# Patient Record
Sex: FEMALE | Race: BLACK OR AFRICAN AMERICAN | NOT HISPANIC OR LATINO
[De-identification: names, ages, dates, MRNs, and addresses within clinical notes are randomized per-mention and may not be internally consistent; named-entity substitution may affect disease eponyms.]

---

## 2018-09-24 PROBLEM — Z00.00 ENCOUNTER FOR PREVENTIVE HEALTH EXAMINATION: Status: ACTIVE | Noted: 2018-09-24

## 2018-10-01 ENCOUNTER — APPOINTMENT (OUTPATIENT)
Dept: CT IMAGING | Facility: CLINIC | Age: 63
End: 2018-10-01

## 2018-10-25 ENCOUNTER — APPOINTMENT (OUTPATIENT)
Dept: CT IMAGING | Facility: CLINIC | Age: 63
End: 2018-10-25

## 2020-10-23 ENCOUNTER — APPOINTMENT (OUTPATIENT)
Dept: CARDIOLOGY | Facility: CLINIC | Age: 65
End: 2020-10-23
Payer: MEDICARE

## 2020-10-23 ENCOUNTER — NON-APPOINTMENT (OUTPATIENT)
Age: 65
End: 2020-10-23

## 2020-10-23 VITALS — HEIGHT: 55 IN | OXYGEN SATURATION: 94 % | WEIGHT: 147 LBS | BODY MASS INDEX: 34.02 KG/M2 | HEART RATE: 63 BPM

## 2020-10-23 VITALS — SYSTOLIC BLOOD PRESSURE: 120 MMHG | DIASTOLIC BLOOD PRESSURE: 70 MMHG

## 2020-10-23 DIAGNOSIS — R93.5 ABNORMAL FINDINGS ON DIAGNOSTIC IMAGING OF OTHER ABDOMINAL REGIONS, INCLUDING RETROPERITONEUM: ICD-10-CM

## 2020-10-23 DIAGNOSIS — R20.0 ANESTHESIA OF SKIN: ICD-10-CM

## 2020-10-23 DIAGNOSIS — R35.0 FREQUENCY OF MICTURITION: ICD-10-CM

## 2020-10-23 DIAGNOSIS — R20.2 ANESTHESIA OF SKIN: ICD-10-CM

## 2020-10-23 DIAGNOSIS — Z87.891 PERSONAL HISTORY OF NICOTINE DEPENDENCE: ICD-10-CM

## 2020-10-23 DIAGNOSIS — M54.9 DORSALGIA, UNSPECIFIED: ICD-10-CM

## 2020-10-23 DIAGNOSIS — Z83.79 FAMILY HISTORY OF OTHER DISEASES OF THE DIGESTIVE SYSTEM: ICD-10-CM

## 2020-10-23 DIAGNOSIS — Z82.0 FAMILY HISTORY OF EPILEPSY AND OTHER DISEASES OF THE NERVOUS SYSTEM: ICD-10-CM

## 2020-10-23 PROCEDURE — 99072 ADDL SUPL MATRL&STAF TM PHE: CPT

## 2020-10-23 PROCEDURE — 93000 ELECTROCARDIOGRAM COMPLETE: CPT

## 2020-10-23 PROCEDURE — 99204 OFFICE O/P NEW MOD 45 MIN: CPT

## 2020-10-23 PROCEDURE — 99407 BEHAV CHNG SMOKING > 10 MIN: CPT

## 2020-10-26 NOTE — PHYSICAL EXAM
[General Appearance - Well Developed] : well developed [Normal Appearance] : normal appearance [Well Groomed] : well groomed [General Appearance - Well Nourished] : well nourished [No Deformities] : no deformities [General Appearance - In No Acute Distress] : no acute distress [Normal Conjunctiva] : the conjunctiva exhibited no abnormalities [Eyelids - No Xanthelasma] : the eyelids demonstrated no xanthelasmas [Normal Oral Mucosa] : normal oral mucosa [No Oral Pallor] : no oral pallor [No Oral Cyanosis] : no oral cyanosis [Normal Jugular Venous A Waves Present] : normal jugular venous A waves present [Normal Jugular Venous V Waves Present] : normal jugular venous V waves present [No Jugular Venous Horvath A Waves] : no jugular venous horvath A waves [Heart Rate And Rhythm] : heart rate and rhythm were normal [Heart Sounds] : normal S1 and S2 [Murmurs] : no murmurs present [Respiration, Rhythm And Depth] : normal respiratory rhythm and effort [Exaggerated Use Of Accessory Muscles For Inspiration] : no accessory muscle use [Auscultation Breath Sounds / Voice Sounds] : lungs were clear to auscultation bilaterally [Abdomen Soft] : soft [Abdomen Tenderness] : non-tender [Abdomen Mass (___ Cm)] : no abdominal mass palpated [Abnormal Walk] : normal gait [Nail Clubbing] : no clubbing of the fingernails [Cyanosis, Localized] : no localized cyanosis [Petechial Hemorrhages (___cm)] : no petechial hemorrhages [Skin Color & Pigmentation] : normal skin color and pigmentation [] : no rash [No Venous Stasis] : no venous stasis [Skin Lesions] : no skin lesions [No Skin Ulcers] : no skin ulcer [No Xanthoma] : no  xanthoma was observed [Oriented To Time, Place, And Person] : oriented to person, place, and time [Affect] : the affect was normal [Mood] : the mood was normal [No Anxiety] : not feeling anxious [FreeTextEntry1] : She uses a walker.  She cannot walk on treadmill.

## 2020-10-26 NOTE — DISCUSSION/SUMMARY
[FreeTextEntry1] : At this time I did not make any changes in her treatment.  I strongly advised her to stop smoking.  Smoking cessation counseling performed more than 10 minutes and  her risk of continued smoking and methods to quit were discussed.  In view of her multiple risk factors I recommended that she have a pharmacological stress test.  Based on the results further recommendations will follow.

## 2020-10-26 NOTE — ASSESSMENT
[FreeTextEntry1] : Her blood pressure is well controlled and her recent hemoglobin A1c was 6.29.  Continuing to smoke is an issue.  She had fasting blood tests in September.  I will try and get copies of the same.  She has multiple risk factors for coronary disease.  He does not have angina.  However a her exercise is limited.  It is  not known if she was getting symptoms with increased physical exertion.

## 2020-10-26 NOTE — REASON FOR VISIT
[Initial Evaluation] : an initial evaluation of [Hyperlipidemia] : hyperlipidemia [Hypertension] : hypertension [FreeTextEntry1] : DM,

## 2020-10-26 NOTE — HISTORY OF PRESENT ILLNESS
[FreeTextEntry1] : 85-year-old  lady was referred by Dr. May Andrade for routine cardiac evaluation.  She had seen Dr. Andrade for a routine visit.  Because of her risk factors she felt that it would be a good idea for her to get routine cardiovascular evaluation.\par \par She denies any exertional chest pain, palpitation, shortness of breath or dizziness.  She had right hip replaced in June of last year.  The left has been bothering since then and she actually needs left hip to be replaced as well.  Dr. Corona wanted her to try and lose some more weight before he would take her for surgery.  She had physical therapy for 3 weeks after the hip replacement but none since.  She does get low back pain which sometimes radiates down the right leg.  She has also diabetic neuropathy.  She is very active in her building and was also taking some dance classes.  When she goes out to walk she is able to walk about 3 blocks and then she feels tired in her legs and thighs.  She also gets some pain.  She does not get any exertional chest pain or shortness of breath.

## 2021-01-18 ENCOUNTER — APPOINTMENT (OUTPATIENT)
Dept: CARDIOLOGY | Facility: CLINIC | Age: 66
End: 2021-01-18
Payer: MEDICARE

## 2021-01-18 VITALS — HEART RATE: 65 BPM | WEIGHT: 140 LBS | HEIGHT: 55 IN | BODY MASS INDEX: 32.4 KG/M2 | OXYGEN SATURATION: 96 %

## 2021-01-18 VITALS — SYSTOLIC BLOOD PRESSURE: 126 MMHG | DIASTOLIC BLOOD PRESSURE: 66 MMHG

## 2021-01-18 DIAGNOSIS — M19.90 UNSPECIFIED OSTEOARTHRITIS, UNSPECIFIED SITE: ICD-10-CM

## 2021-01-18 PROCEDURE — 99215 OFFICE O/P EST HI 40 MIN: CPT

## 2021-01-18 PROCEDURE — 93000 ELECTROCARDIOGRAM COMPLETE: CPT | Mod: NC

## 2021-01-18 PROCEDURE — 99407 BEHAV CHNG SMOKING > 10 MIN: CPT

## 2021-01-18 PROCEDURE — 99072 ADDL SUPL MATRL&STAF TM PHE: CPT

## 2021-01-18 RX ORDER — PSYLLIUM HUSK 0.4 G
25 MCG CAPSULE ORAL
Refills: 0 | Status: ACTIVE | COMMUNITY

## 2021-01-18 RX ORDER — SITAGLIPTIN 100 MG/1
100 TABLET, FILM COATED ORAL DAILY
Refills: 0 | Status: ACTIVE | COMMUNITY

## 2021-01-20 ENCOUNTER — APPOINTMENT (OUTPATIENT)
Dept: CARDIOLOGY | Facility: CLINIC | Age: 66
End: 2021-01-20
Payer: MEDICARE

## 2021-01-20 PROCEDURE — 93016 CV STRESS TEST SUPVJ ONLY: CPT

## 2021-01-20 PROCEDURE — 78452 HT MUSCLE IMAGE SPECT MULT: CPT | Mod: 26

## 2021-01-20 PROCEDURE — 93018 CV STRESS TEST I&R ONLY: CPT

## 2021-01-20 PROCEDURE — 99072 ADDL SUPL MATRL&STAF TM PHE: CPT

## 2021-01-24 NOTE — ASSESSMENT
[FreeTextEntry1] : She is stable from cardiac point of view.  Blood pressure is well controlled.  He has no symptoms of ACS or fluid overload.  However she had been scheduled in the past to have a pharmacological stress test to rule out underlying coronary disease given all the risk factors.  It would be a good idea to do it preop. The results of the test will be very helpful to the surgeon and anesthesiologist.  As of now there are no absolute contraindications.

## 2021-01-24 NOTE — DISCUSSION/SUMMARY
[FreeTextEntry1] : As she is stable, I did not make any changes in her treatment.  However I agree that she should have the stress test.  I also ordered echocardiogram to get a good idea of her left ventricular size and function as knowledge of ejection fraction would be helpful during surgery.\par \par She will get presurgical testing done.  Based upon the results of above final clearance will follow.\par \par Smoking cessation counseling was performed for over 10 minutes.  Risk of continued smoking and methods to quit were discussed.  She said that she had quit before.  She will try again was  her response.\par \par ADDENDUM: 1-22-21:\par \par Her stress test was negative for any ischemia or prior infarct. She is cleared from cardiac point of view. for hip replacement.

## 2021-01-24 NOTE — PHYSICAL EXAM
[General Appearance - Well Developed] : well developed [Normal Appearance] : normal appearance [Well Groomed] : well groomed [General Appearance - Well Nourished] : well nourished [No Deformities] : no deformities [General Appearance - In No Acute Distress] : no acute distress [Normal Conjunctiva] : the conjunctiva exhibited no abnormalities [Eyelids - No Xanthelasma] : the eyelids demonstrated no xanthelasmas [Normal Oral Mucosa] : normal oral mucosa [No Oral Pallor] : no oral pallor [Normal Jugular Venous A Waves Present] : normal jugular venous A waves present [No Oral Cyanosis] : no oral cyanosis [Normal Jugular Venous V Waves Present] : normal jugular venous V waves present [No Jugular Venous Horvath A Waves] : no jugular venous horvath A waves [Respiration, Rhythm And Depth] : normal respiratory rhythm and effort [Exaggerated Use Of Accessory Muscles For Inspiration] : no accessory muscle use [Auscultation Breath Sounds / Voice Sounds] : lungs were clear to auscultation bilaterally [Heart Sounds] : normal S1 and S2 [Heart Rate And Rhythm] : heart rate and rhythm were normal [Murmurs] : no murmurs present [Abdomen Soft] : soft [Abdomen Tenderness] : non-tender [Abdomen Mass (___ Cm)] : no abdominal mass palpated [Abnormal Walk] : normal gait [Nail Clubbing] : no clubbing of the fingernails [Cyanosis, Localized] : no localized cyanosis [Petechial Hemorrhages (___cm)] : no petechial hemorrhages [] : no rash [Skin Color & Pigmentation] : normal skin color and pigmentation [No Venous Stasis] : no venous stasis [Skin Lesions] : no skin lesions [No Skin Ulcers] : no skin ulcer [No Xanthoma] : no  xanthoma was observed [Oriented To Time, Place, And Person] : oriented to person, place, and time [Mood] : the mood was normal [Affect] : the affect was normal [No Anxiety] : not feeling anxious [FreeTextEntry1] : She uses a walker.  She cannot walk on treadmill.

## 2021-01-24 NOTE — HISTORY OF PRESENT ILLNESS
[Preoperative Visit] : for a medical evaluation prior to surgery [Scheduled Procedure ___] : a [unfilled] [Date of Surgery ___] : on [unfilled] [Surgeon Name ___] : surgeon: [unfilled] [Poor Exercise Tolerance] : poor exercise tolerance [Diabetes] : diabetes [Cardiovascular Disease] : cardiovascular disease [Nicotine Dependence] : nicotine dependence [Prior Anesthesia] : Prior anesthesia [Electrocardiogram] : ~T an ECG ~C was performed [Cardiovascular Stress Test] : a cardiac stress test ~T ~C was performed [Good] : Good [Fever] : no fever [Chills] : no chills [Fatigue] : no fatigue [Chest Pain] : no chest pain [Cough] : no cough [Dyspnea] : no dyspnea [Dysuria] : no dysuria [Urinary Frequency] : no urinary frequency [Nausea] : no nausea [Diarrhea] : no diarrhea [Abdominal Pain] : no abdominal pain [Easy Bruising] : no easy bruising [Lower Extremity Swelling] : no lower extremity swelling [Pulmonary Disease] : no pulmonary disease [Alcohol Use] : no  alcohol use [Anti-Platelet Agents] : no anti-platelet agents [Renal Disease] : no renal disease [GI Disease] : no gastrointestinal disease [Sleep Apnea] : no sleep apnea [Thromboembolic Problems] : no thromboembolic problems [Frequent use of NSAIDs] : no use of NSAIDs [Transfusion Reaction] : no transfusion reaction [Impaired Immunity] : no impaired immunity [Steroid Use in Last 6 Months] : no steroid use in the last six months [Frequent Aspirin Use] : no frequent aspirin use [Anesthesia Reaction] : no anesthesia reaction [Prev Anesthesia Reaction] : no previous anesthesia reaction [Sudden Death] : no sudden death [Clotting Disorder] : no clotting disorder [Bleeding Disorder] : no bleeding disorder [de-identified] : at [facility]. [FreeTextEntry1] : 65-year-old lady was referred for cardiac evaluation and clearance for left hip replacement.  She had the right hip replaced in June 2019 without any complications.\par \par She denies any chest pain, palpitation, shortness of breath or dizziness.  She has cut down her smoking but still is smoking couple of cigarettes a day.  She  has been scheduled for a stress test to be done on 20 January.  It had been ordered in October 2020.

## 2021-01-25 ENCOUNTER — APPOINTMENT (OUTPATIENT)
Dept: CARDIOLOGY | Facility: CLINIC | Age: 66
End: 2021-01-25

## 2021-02-19 ENCOUNTER — APPOINTMENT (OUTPATIENT)
Dept: CARDIOLOGY | Facility: CLINIC | Age: 66
End: 2021-02-19

## 2021-02-22 ENCOUNTER — APPOINTMENT (OUTPATIENT)
Dept: CARDIOLOGY | Facility: CLINIC | Age: 66
End: 2021-02-22

## 2021-03-25 ENCOUNTER — APPOINTMENT (OUTPATIENT)
Dept: CARDIOLOGY | Facility: CLINIC | Age: 66
End: 2021-03-25
Payer: MEDICARE

## 2021-03-25 VITALS — DIASTOLIC BLOOD PRESSURE: 80 MMHG | SYSTOLIC BLOOD PRESSURE: 130 MMHG

## 2021-03-25 VITALS
BODY MASS INDEX: 32.63 KG/M2 | HEART RATE: 82 BPM | OXYGEN SATURATION: 95 % | WEIGHT: 141 LBS | HEIGHT: 55 IN | TEMPERATURE: 97.5 F

## 2021-03-25 DIAGNOSIS — D64.9 ANEMIA, UNSPECIFIED: ICD-10-CM

## 2021-03-25 PROCEDURE — 99213 OFFICE O/P EST LOW 20 MIN: CPT

## 2021-03-25 PROCEDURE — 99072 ADDL SUPL MATRL&STAF TM PHE: CPT

## 2021-03-28 NOTE — REASON FOR VISIT
[Follow-Up - Clinic] : a clinic follow-up of [Atrial Fibrillation] : atrial fibrillation [Hyperlipidemia] : hyperlipidemia [Hypertension] : hypertension [FreeTextEntry1] : anemia

## 2021-03-28 NOTE — ASSESSMENT
[FreeTextEntry1] : Her blood pressure is good.  She is recovering nicely after the left hip replacement.  She only has minimal residual soreness.  Feels much better after she quit smoking.

## 2021-03-28 NOTE — DISCUSSION/SUMMARY
[FreeTextEntry1] : At this time she is doing very well.  Blood pressure is well controlled.  I did not make any changes in her treatment.\par \par Stress test done in January was normal.  Results were reviewed with her.

## 2021-03-28 NOTE — PHYSICAL EXAM
[General Appearance - Well Developed] : well developed [Normal Appearance] : normal appearance [Well Groomed] : well groomed [General Appearance - Well Nourished] : well nourished [No Deformities] : no deformities [General Appearance - In No Acute Distress] : no acute distress [Normal Conjunctiva] : the conjunctiva exhibited no abnormalities [Eyelids - No Xanthelasma] : the eyelids demonstrated no xanthelasmas [Normal Oral Mucosa] : normal oral mucosa [No Oral Pallor] : no oral pallor [No Oral Cyanosis] : no oral cyanosis [Normal Jugular Venous A Waves Present] : normal jugular venous A waves present [Normal Jugular Venous V Waves Present] : normal jugular venous V waves present [No Jugular Venous Horvath A Waves] : no jugular venous horvath A waves [Respiration, Rhythm And Depth] : normal respiratory rhythm and effort [Exaggerated Use Of Accessory Muscles For Inspiration] : no accessory muscle use [Auscultation Breath Sounds / Voice Sounds] : lungs were clear to auscultation bilaterally [Heart Rate And Rhythm] : heart rate and rhythm were normal [Heart Sounds] : normal S1 and S2 [Murmurs] : no murmurs present [Abdomen Soft] : soft [Abdomen Tenderness] : non-tender [Abdomen Mass (___ Cm)] : no abdominal mass palpated [Abnormal Walk] : normal gait [Nail Clubbing] : no clubbing of the fingernails [Cyanosis, Localized] : no localized cyanosis [Petechial Hemorrhages (___cm)] : no petechial hemorrhages [Skin Color & Pigmentation] : normal skin color and pigmentation [] : no rash [No Venous Stasis] : no venous stasis [Skin Lesions] : no skin lesions [No Skin Ulcers] : no skin ulcer [No Xanthoma] : no  xanthoma was observed [Oriented To Time, Place, And Person] : oriented to person, place, and time [Affect] : the affect was normal [Mood] : the mood was normal [No Anxiety] : not feeling anxious [FreeTextEntry1] : She uses a walker.  She cannot walk on treadmill.

## 2021-03-28 NOTE — HISTORY OF PRESENT ILLNESS
[FreeTextEntry1] : She denies any chest pain, palpitation, shortness of breath or dizziness.  She quit smoking after her surgery on February 8.  She had left hip replaced.  Since then her breathing is become better.  Pulmonary function tests have progressively improved.

## 2021-06-24 ENCOUNTER — NON-APPOINTMENT (OUTPATIENT)
Age: 66
End: 2021-06-24

## 2021-06-24 ENCOUNTER — APPOINTMENT (OUTPATIENT)
Dept: CARDIOLOGY | Facility: CLINIC | Age: 66
End: 2021-06-24
Payer: MEDICARE

## 2021-06-24 VITALS — SYSTOLIC BLOOD PRESSURE: 104 MMHG | DIASTOLIC BLOOD PRESSURE: 68 MMHG

## 2021-06-24 VITALS
WEIGHT: 137 LBS | BODY MASS INDEX: 31.7 KG/M2 | OXYGEN SATURATION: 96 % | HEART RATE: 87 BPM | HEIGHT: 55 IN | TEMPERATURE: 97.3 F

## 2021-06-24 DIAGNOSIS — D57.3 SICKLE-CELL TRAIT: ICD-10-CM

## 2021-06-24 PROCEDURE — 93000 ELECTROCARDIOGRAM COMPLETE: CPT

## 2021-06-24 PROCEDURE — 99214 OFFICE O/P EST MOD 30 MIN: CPT

## 2021-06-24 NOTE — HISTORY OF PRESENT ILLNESS
[FreeTextEntry1] : Denies any recurrence of palpitations.  She had ablation of atrial fibrillation.  She states that her appetite is decreased and she has lost about 4 to 5 pounds.  She is concerned about that.  There is no melena.  She has constipation every now and then which is felt to be from medication.  She was diagnosed to have sickle cell trait by Dr. Hammond.\par \par She also saw Dr. Valencia who did breathing test and told her that everything was fine.

## 2021-06-24 NOTE — PHYSICAL EXAM

## 2021-06-24 NOTE — ASSESSMENT
[FreeTextEntry1] : She is stable from cardiac point of view.  She does not have any symptoms of lightheadedness etc. you do her blood pressure is low.  Previously it was in the 130s range.  Her medications have not been changed over a duration of time.\par \par Diabetes status is good.  Blood sugar was 125.  Told her to discuss the above commendations with PCP.  I do not see any significant COPD. She has responded well to rx. She gave up smoking after my talk with her.

## 2021-06-24 NOTE — DISCUSSION/SUMMARY
[FreeTextEntry1] : This time I did not make any changes in her treatment.  I suggest that if her blood pressure continues to be low then the dose of lisinopril should be decreased to 5 mg daily.

## 2021-09-23 ENCOUNTER — APPOINTMENT (OUTPATIENT)
Dept: CARDIOLOGY | Facility: CLINIC | Age: 66
End: 2021-09-23
Payer: MEDICARE

## 2021-09-23 VITALS — OXYGEN SATURATION: 97 % | BODY MASS INDEX: 31.83 KG/M2 | WEIGHT: 132 LBS | HEART RATE: 76 BPM

## 2021-09-23 VITALS — SYSTOLIC BLOOD PRESSURE: 116 MMHG | DIASTOLIC BLOOD PRESSURE: 60 MMHG

## 2021-09-23 PROCEDURE — 99214 OFFICE O/P EST MOD 30 MIN: CPT

## 2021-09-23 NOTE — ASSESSMENT
[FreeTextEntry1] : He is doing very well from cardiac point of view.  Blood pressure is well controlled.  She has no cardiac symptoms.  Screening colonoscopy 5 October.  Her PCP is setting her up for pneumococcal and zoster vaccines.

## 2021-12-28 ENCOUNTER — APPOINTMENT (OUTPATIENT)
Dept: CARDIOLOGY | Facility: CLINIC | Age: 66
End: 2021-12-28
Payer: MEDICARE

## 2021-12-28 ENCOUNTER — NON-APPOINTMENT (OUTPATIENT)
Age: 66
End: 2021-12-28

## 2021-12-28 VITALS
HEART RATE: 61 BPM | OXYGEN SATURATION: 98 % | TEMPERATURE: 97 F | SYSTOLIC BLOOD PRESSURE: 104 MMHG | DIASTOLIC BLOOD PRESSURE: 71 MMHG | WEIGHT: 138 LBS | HEIGHT: 55 IN | BODY MASS INDEX: 31.94 KG/M2

## 2021-12-28 PROCEDURE — 93000 ELECTROCARDIOGRAM COMPLETE: CPT

## 2021-12-28 PROCEDURE — 99213 OFFICE O/P EST LOW 20 MIN: CPT

## 2021-12-28 NOTE — HISTORY OF PRESENT ILLNESS
[FreeTextEntry1] : Patient is 66 year female with PMH of HTN, DM, HLD, Arthritis with bilateral Hip replacement and S/P A. Fib ablation., \par Patient came to be seen in office for follow up. No significant complaints and can walk with walker 2-3 blocks. Her CV status is stable since last visit.\par

## 2021-12-28 NOTE — ASSESSMENT
[FreeTextEntry1] : 66 year  female with PMH of HTN, DM, HLD and A. Fib ablation and S/P bilateral hip[ replacement. Patient is stable without any complaints.She can walk over half hr with wheal walker without any sign and symptoms of angina or CHF. His functional status is stable.\par

## 2021-12-28 NOTE — REASON FOR VISIT
[Arrhythmia/ECG Abnorrmalities] : arrhythmia/ECG abnormalities [Hyperlipidemia] : hyperlipidemia [Hypertension] : hypertension [FreeTextEntry1] : Follow Up.

## 2022-01-14 ENCOUNTER — APPOINTMENT (OUTPATIENT)
Dept: CARDIOLOGY | Facility: CLINIC | Age: 67
End: 2022-01-14
Payer: MEDICARE

## 2022-01-14 PROCEDURE — 93306 TTE W/DOPPLER COMPLETE: CPT

## 2022-01-20 NOTE — HISTORY OF PRESENT ILLNESS
[FreeTextEntry1] : She denies any chest pain, palpitation, shortness of breath or dizziness.  She quit smoking after her surgery on February 8.  She had left hip replaced.  Since then her breathing is become better.  Pulmonary function tests have progressively improved. Yes

## 2022-04-28 ENCOUNTER — APPOINTMENT (OUTPATIENT)
Dept: CARDIOLOGY | Facility: CLINIC | Age: 67
End: 2022-04-28
Payer: MEDICARE

## 2022-04-28 VITALS — WEIGHT: 141 LBS | HEIGHT: 55 IN | BODY MASS INDEX: 32.63 KG/M2 | TEMPERATURE: 97.1 F | HEART RATE: 71 BPM

## 2022-04-28 VITALS — DIASTOLIC BLOOD PRESSURE: 90 MMHG | SYSTOLIC BLOOD PRESSURE: 186 MMHG

## 2022-04-28 VITALS — SYSTOLIC BLOOD PRESSURE: 186 MMHG | DIASTOLIC BLOOD PRESSURE: 86 MMHG

## 2022-04-28 PROCEDURE — 99214 OFFICE O/P EST MOD 30 MIN: CPT

## 2022-04-28 RX ORDER — IBUPROFEN 800 MG/1
800 TABLET, FILM COATED ORAL DAILY
Refills: 0 | Status: DISCONTINUED | COMMUNITY
Start: 2021-01-08 | End: 2022-04-28

## 2022-04-28 RX ORDER — CHLORHEXIDINE GLUCONATE 4 %
325 (65 FE) LIQUID (ML) TOPICAL 3 TIMES DAILY
Qty: 270 | Refills: 1 | Status: DISCONTINUED | COMMUNITY
End: 2022-04-28

## 2022-04-28 RX ORDER — UMECLIDINIUM BROMIDE AND VILANTEROL TRIFENATATE 62.5; 25 UG/1; UG/1
62.5-25 POWDER RESPIRATORY (INHALATION)
Qty: 3 | Refills: 1 | Status: ACTIVE | COMMUNITY
Start: 2022-04-28 | End: 1900-01-01

## 2022-04-28 RX ORDER — BUDESONIDE AND FORMOTEROL FUMARATE DIHYDRATE 160; 4.5 UG/1; UG/1
160-4.5 AEROSOL RESPIRATORY (INHALATION) TWICE DAILY
Qty: 1 | Refills: 3 | Status: DISCONTINUED | COMMUNITY
End: 2022-04-28

## 2022-04-28 RX ORDER — GABAPENTIN 300 MG
300 TABLET ORAL DAILY
Refills: 0 | Status: DISCONTINUED | COMMUNITY
End: 2022-04-28

## 2022-04-28 NOTE — DISCUSSION/SUMMARY
[FreeTextEntry1] : I restarted hydrochlorothiazide.  I also told the patient to get me copies of blood test reports from  for my evaluation.  I will follow-up the blood pressure in 3 weeks

## 2022-04-28 NOTE — PHYSICAL EXAM

## 2022-04-28 NOTE — HISTORY OF PRESENT ILLNESS
[FreeTextEntry1] : 66-year-old lady came for follow-up of hypertension, atrial fibrillation etc.\par \par She denies any chest pain, palpitation, shortness of breath or dizziness.  She now weighs freestyle joesph and her blood sugar is 86.  Hemoglobin A1c has come down to 6.6.  Dr. Hammond is a hematologist stopped iron supplementation.  She is also not taking ibuprofen.  She was also prescribed Jardiance for her blood sugar.  The dose of lisinopril was increased to 40 mg as her blood pressure is up.  PCP also stopped hydrochlorothiazide.

## 2022-04-28 NOTE — ASSESSMENT
[FreeTextEntry1] : Her blood pressure is extremely high on the current combination of medications.  I believe diuretic is an important component of treatment for blood pressure.  She also needs to watch her sodium intake.  Unless there is contraindication like CKD etc. she should remain on a diuretic.\par \par I have not had any blood test on her for almost a year and a half.

## 2022-05-09 DIAGNOSIS — Z01.810 ENCOUNTER FOR PREPROCEDURAL CARDIOVASCULAR EXAMINATION: ICD-10-CM

## 2022-05-16 ENCOUNTER — APPOINTMENT (OUTPATIENT)
Dept: CARDIOLOGY | Facility: CLINIC | Age: 67
End: 2022-05-16

## 2022-05-18 LAB
25(OH)D3 SERPL-MCNC: 49.7 NG/ML
ALBUMIN SERPL ELPH-MCNC: 4.6 G/DL
ALP BLD-CCNC: 101 U/L
ALT SERPL-CCNC: 50 U/L
ANION GAP SERPL CALC-SCNC: 12 MMOL/L
AST SERPL-CCNC: 33 U/L
BASOPHILS # BLD AUTO: 0.11 K/UL
BASOPHILS NFR BLD AUTO: 1.4 %
BILIRUB SERPL-MCNC: 0.5 MG/DL
BUN SERPL-MCNC: 16 MG/DL
CALCIUM SERPL-MCNC: 9.5 MG/DL
CHLORIDE SERPL-SCNC: 105 MMOL/L
CHOLEST SERPL-MCNC: 154 MG/DL
CO2 SERPL-SCNC: 25 MMOL/L
CREAT SERPL-MCNC: 0.85 MG/DL
EGFR: 75 ML/MIN/1.73M2
EOSINOPHIL # BLD AUTO: 0.25 K/UL
EOSINOPHIL NFR BLD AUTO: 3.1 %
GLUCOSE SERPL-MCNC: 136 MG/DL
HCT VFR BLD CALC: 46.4 %
HDLC SERPL-MCNC: 45 MG/DL
HGB BLD-MCNC: 15.3 G/DL
IMM GRANULOCYTES NFR BLD AUTO: 0.2 %
LDLC SERPL CALC-MCNC: 83 MG/DL
LYMPHOCYTES # BLD AUTO: 2.08 K/UL
LYMPHOCYTES NFR BLD AUTO: 25.9 %
MAN DIFF?: NORMAL
MCHC RBC-ENTMCNC: 31.1 PG
MCHC RBC-ENTMCNC: 33 GM/DL
MCV RBC AUTO: 94.3 FL
MONOCYTES # BLD AUTO: 0.74 K/UL
MONOCYTES NFR BLD AUTO: 9.2 %
NEUTROPHILS # BLD AUTO: 4.83 K/UL
NEUTROPHILS NFR BLD AUTO: 60.2 %
NONHDLC SERPL-MCNC: 109 MG/DL
PLATELET # BLD AUTO: 275 K/UL
POTASSIUM SERPL-SCNC: 4.5 MMOL/L
PROT SERPL-MCNC: 7 G/DL
RBC # BLD: 4.92 M/UL
RBC # FLD: 13.6 %
SODIUM SERPL-SCNC: 143 MMOL/L
TRIGL SERPL-MCNC: 128 MG/DL
TSH SERPL-ACNC: 1.09 UIU/ML
WBC # FLD AUTO: 8.03 K/UL

## 2022-05-19 ENCOUNTER — NON-APPOINTMENT (OUTPATIENT)
Age: 67
End: 2022-05-19

## 2022-05-19 ENCOUNTER — APPOINTMENT (OUTPATIENT)
Dept: CARDIOLOGY | Facility: CLINIC | Age: 67
End: 2022-05-19
Payer: MEDICARE

## 2022-05-19 VITALS
BODY MASS INDEX: 32.4 KG/M2 | HEART RATE: 73 BPM | OXYGEN SATURATION: 98 % | WEIGHT: 140 LBS | TEMPERATURE: 97.2 F | HEIGHT: 55 IN

## 2022-05-19 VITALS — DIASTOLIC BLOOD PRESSURE: 70 MMHG | SYSTOLIC BLOOD PRESSURE: 128 MMHG

## 2022-05-19 PROCEDURE — 93000 ELECTROCARDIOGRAM COMPLETE: CPT

## 2022-05-19 PROCEDURE — 99214 OFFICE O/P EST MOD 30 MIN: CPT

## 2022-05-19 NOTE — HISTORY OF PRESENT ILLNESS
[FreeTextEntry1] : 66-year-old lady with history of hypertension, atrial fibrillation status post ablation came for follow-up.  She denies any chest pain, palpitation, shortness of breath or dizziness.\par \par She checked her blood pressure at home earlier this week.  It was 123/88.

## 2022-05-19 NOTE — ASSESSMENT
[FreeTextEntry1] : She is doing well from cardiac point of view.  Blood pressure is well controlled on current medications.\par \par Blood test done on 18 May showed triglycerides of 128, total cholesterol 154, HDL 45, LDL 83 and non-.  CMP was normal except for blood sugar of 136 and ALT of 15.  Hemoglobin was 15.3, TSH was 1.09 and vitamin D level was 49.7.

## 2022-05-19 NOTE — PHYSICAL EXAM

## 2022-05-19 NOTE — DISCUSSION/SUMMARY
[FreeTextEntry1] : As she is doing well, I did not make any changes in her treatment.  I encouraged her to continue to follow a low-salt diet and try to lose weight.  Copies of blood test reports were given to her to be forwarded to her PCP.

## 2022-09-11 ENCOUNTER — RESULT CHARGE (OUTPATIENT)
Age: 67
End: 2022-09-11

## 2022-09-12 ENCOUNTER — APPOINTMENT (OUTPATIENT)
Dept: CARDIOLOGY | Facility: CLINIC | Age: 67
End: 2022-09-12

## 2022-09-12 ENCOUNTER — NON-APPOINTMENT (OUTPATIENT)
Age: 67
End: 2022-09-12

## 2022-09-12 VITALS
TEMPERATURE: 97.2 F | OXYGEN SATURATION: 96 % | SYSTOLIC BLOOD PRESSURE: 145 MMHG | DIASTOLIC BLOOD PRESSURE: 84 MMHG | HEIGHT: 55 IN | BODY MASS INDEX: 32.4 KG/M2 | WEIGHT: 140 LBS | HEART RATE: 95 BPM

## 2022-09-12 DIAGNOSIS — E11.9 TYPE 2 DIABETES MELLITUS W/OUT COMPLICATIONS: ICD-10-CM

## 2022-09-12 PROCEDURE — 99215 OFFICE O/P EST HI 40 MIN: CPT

## 2022-09-12 PROCEDURE — 93000 ELECTROCARDIOGRAM COMPLETE: CPT

## 2022-09-12 NOTE — REASON FOR VISIT
[FreeTextEntry1] : CHERIE GARCIA 67 year F reports no dyspnea, chest pain, palpitations, syncope, claudication or peripheral edema. FHx CAD ;  Tobacco 2y Reformed; DM Yes HLD Yes; HTN Yes; BMI 34\par NSR 56 BPM normal intervals; 1/11 Echo LVEF 68% Aortic sclerosis GLENNY  1.86 cm2. Continue HCTZ, lisinopril, atorvastatin, coreg. PCP added zetia to regimen. No new complaints. RTC 3m

## 2022-12-12 ENCOUNTER — APPOINTMENT (OUTPATIENT)
Dept: CARDIOLOGY | Facility: CLINIC | Age: 67
End: 2022-12-12

## 2023-01-10 ENCOUNTER — NON-APPOINTMENT (OUTPATIENT)
Age: 68
End: 2023-01-10

## 2023-01-10 ENCOUNTER — APPOINTMENT (OUTPATIENT)
Dept: CARDIOLOGY | Facility: CLINIC | Age: 68
End: 2023-01-10
Payer: MEDICARE

## 2023-01-10 VITALS
TEMPERATURE: 96.7 F | HEART RATE: 60 BPM | WEIGHT: 138 LBS | SYSTOLIC BLOOD PRESSURE: 127 MMHG | DIASTOLIC BLOOD PRESSURE: 85 MMHG | HEIGHT: 55 IN | OXYGEN SATURATION: 100 % | BODY MASS INDEX: 31.94 KG/M2

## 2023-01-10 PROCEDURE — 93000 ELECTROCARDIOGRAM COMPLETE: CPT

## 2023-01-10 PROCEDURE — 99215 OFFICE O/P EST HI 40 MIN: CPT

## 2023-01-10 RX ORDER — HYDROCHLOROTHIAZIDE 12.5 MG/1
12.5 CAPSULE ORAL DAILY
Qty: 30 | Refills: 3 | Status: DISCONTINUED | COMMUNITY
Start: 2021-01-15 | End: 2023-01-10

## 2023-01-10 RX ORDER — EZETIMIBE 10 MG/1
10 TABLET ORAL
Qty: 90 | Refills: 1 | Status: DISCONTINUED | COMMUNITY
Start: 2022-09-12 | End: 2023-01-10

## 2023-01-10 RX ORDER — ATORVASTATIN CALCIUM 40 MG/1
40 TABLET, FILM COATED ORAL DAILY
Qty: 90 | Refills: 1 | Status: DISCONTINUED | COMMUNITY
End: 2023-01-10

## 2023-01-10 RX ORDER — ATORVASTATIN CALCIUM 80 MG/1
80 TABLET, FILM COATED ORAL
Qty: 90 | Refills: 1 | Status: ACTIVE | COMMUNITY
Start: 2023-01-10 | End: 1900-01-01

## 2023-01-10 RX ORDER — ALBUTEROL 90 MCG
90 AEROSOL (GRAM) INHALATION
Refills: 0 | Status: DISCONTINUED | COMMUNITY
End: 2023-01-10

## 2023-01-10 RX ORDER — LISINOPRIL 40 MG/1
40 TABLET ORAL DAILY
Qty: 90 | Refills: 1 | Status: DISCONTINUED | COMMUNITY
End: 2023-01-10

## 2023-01-10 NOTE — REASON FOR VISIT
[Structural Heart and Valve Disease] : structural heart and valve disease [Hyperlipidemia] : hyperlipidemia [Hypertension] : hypertension [FreeTextEntry1] : CHERIE GARCIA 67 year F reports no dyspnea, chest pain, palpitations, syncope, claudication or peripheral edema. FHx CAD ;  Tobacco 2y Reformed; DM Yes HLD Yes; HTN Yes; BMI 34\par NSR 56 BPM normal intervals; 1/11 Echo LVEF 68% Aortic sclerosis GLENNY  1.86 cm2. Stop HCTZ, lisinopril, start losartan 50mg/d, increase  atorvastatin to 80mg/d, stop zetia continue coreg. No new complaints. RTC 3m

## 2023-04-11 ENCOUNTER — APPOINTMENT (OUTPATIENT)
Dept: CARDIOLOGY | Facility: CLINIC | Age: 68
End: 2023-04-11

## 2023-04-18 ENCOUNTER — APPOINTMENT (OUTPATIENT)
Dept: CARDIOLOGY | Facility: CLINIC | Age: 68
End: 2023-04-18
Payer: MEDICARE

## 2023-04-18 VITALS
BODY MASS INDEX: 32.86 KG/M2 | WEIGHT: 142 LBS | HEART RATE: 70 BPM | OXYGEN SATURATION: 98 % | TEMPERATURE: 97.3 F | HEIGHT: 55 IN | SYSTOLIC BLOOD PRESSURE: 178 MMHG | DIASTOLIC BLOOD PRESSURE: 101 MMHG

## 2023-04-18 DIAGNOSIS — I10 ESSENTIAL (PRIMARY) HYPERTENSION: ICD-10-CM

## 2023-04-18 PROCEDURE — 99214 OFFICE O/P EST MOD 30 MIN: CPT

## 2023-04-18 RX ORDER — EMPAGLIFLOZIN 10 MG/1
10 TABLET, FILM COATED ORAL DAILY
Refills: 0 | Status: ACTIVE | COMMUNITY
Start: 2023-04-18

## 2023-04-18 RX ORDER — DICLOFENAC SODIUM 75 MG/1
75 TABLET, DELAYED RELEASE ORAL TWICE DAILY
Refills: 0 | Status: ACTIVE | COMMUNITY
Start: 2023-04-18

## 2023-04-18 RX ORDER — FINERENONE 10 MG/1
10 TABLET, FILM COATED ORAL EVERY MORNING
Refills: 0 | Status: ACTIVE | COMMUNITY
Start: 2023-04-18

## 2023-04-18 RX ORDER — FOLIC ACID 1 MG/1
1 TABLET ORAL DAILY
Refills: 0 | Status: ACTIVE | COMMUNITY
Start: 2023-04-18

## 2023-04-18 NOTE — REASON FOR VISIT
[Structural Heart and Valve Disease] : structural heart and valve disease [Hypertension] : hypertension [FreeTextEntry1] : CHERIE GARCIA 67 year F reports no dyspnea, chest pain, palpitations, syncope, claudication or peripheral edema. FHx CAD ;  Tobacco 2y Reformed; DM Yes HLD Yes; HTN Yes; BMI 34\par NSR 56 BPM normal intervals; 1/11 Echo LVEF 68% Aortic sclerosis GLENNY  1.86 cm2. Continue losartan 50mg/d, atorvastatin to 80 mg/d,  coreg 12.5 mg. Finerenone 10mg. Dapagliflozin 10mg. Interval BP high asymptomatic , requesting resumption of HCTZ. 178/101 states she is compliant. HR 70/min

## 2023-07-25 ENCOUNTER — APPOINTMENT (OUTPATIENT)
Dept: CARDIOLOGY | Facility: CLINIC | Age: 68
End: 2023-07-25
Payer: MEDICARE

## 2023-07-25 ENCOUNTER — NON-APPOINTMENT (OUTPATIENT)
Age: 68
End: 2023-07-25

## 2023-07-25 VITALS
DIASTOLIC BLOOD PRESSURE: 84 MMHG | SYSTOLIC BLOOD PRESSURE: 123 MMHG | BODY MASS INDEX: 31.27 KG/M2 | OXYGEN SATURATION: 99 % | HEART RATE: 67 BPM | WEIGHT: 135.13 LBS | TEMPERATURE: 96.5 F | HEIGHT: 55 IN

## 2023-07-25 DIAGNOSIS — Z86.79 OTHER SPECIFIED POSTPROCEDURAL STATES: ICD-10-CM

## 2023-07-25 DIAGNOSIS — Z98.890 OTHER SPECIFIED POSTPROCEDURAL STATES: ICD-10-CM

## 2023-07-25 PROCEDURE — 93000 ELECTROCARDIOGRAM COMPLETE: CPT

## 2023-07-25 PROCEDURE — 99214 OFFICE O/P EST MOD 30 MIN: CPT

## 2023-07-25 NOTE — REASON FOR VISIT
[Arrhythmia/ECG Abnorrmalities] : arrhythmia/ECG abnormalities [FreeTextEntry1] : CHERIE GARCIA 67 year F BMI 34 No interval CV complaints \par  FHx CAD ;  Tobacco 2y Reformed; DM Yes HLD Yes; HTN Yes; BMI 34\par 1/23 NSR 67 BPM normal intervals;\par 1/22 Echo LVEF 68% Aortic mild stenosis GLENNY  1.86 cm2.\par Meds:losartan 50mg/d, atorvastatin to 80 mg/d,  coreg 12.5 mg. Finerenone 10mg. Dapagliflozin 10mg.  HCTZ 12.5. 123/84 HR 67/min. RTC 3m

## 2023-10-19 ENCOUNTER — NON-APPOINTMENT (OUTPATIENT)
Age: 68
End: 2023-10-19

## 2023-10-19 ENCOUNTER — APPOINTMENT (OUTPATIENT)
Dept: CARDIOLOGY | Facility: CLINIC | Age: 68
End: 2023-10-19
Payer: MEDICARE

## 2023-10-19 VITALS
SYSTOLIC BLOOD PRESSURE: 104 MMHG | HEIGHT: 55 IN | HEART RATE: 68 BPM | TEMPERATURE: 94.2 F | DIASTOLIC BLOOD PRESSURE: 70 MMHG | WEIGHT: 128 LBS | OXYGEN SATURATION: 98 % | BODY MASS INDEX: 29.62 KG/M2

## 2023-10-19 DIAGNOSIS — E78.00 PURE HYPERCHOLESTEROLEMIA, UNSPECIFIED: ICD-10-CM

## 2023-10-19 DIAGNOSIS — J44.9 CHRONIC OBSTRUCTIVE PULMONARY DISEASE, UNSPECIFIED: ICD-10-CM

## 2023-10-19 DIAGNOSIS — Z86.79 PERSONAL HISTORY OF OTHER DISEASES OF THE CIRCULATORY SYSTEM: ICD-10-CM

## 2023-10-19 PROCEDURE — 93000 ELECTROCARDIOGRAM COMPLETE: CPT

## 2023-10-19 PROCEDURE — 99215 OFFICE O/P EST HI 40 MIN: CPT

## 2023-10-19 RX ORDER — HYDROCHLOROTHIAZIDE 12.5 MG/1
12.5 TABLET ORAL
Qty: 90 | Refills: 1 | Status: DISCONTINUED | COMMUNITY
Start: 2023-04-18 | End: 2023-10-19

## 2023-10-19 RX ORDER — LOSARTAN POTASSIUM 50 MG/1
50 TABLET, FILM COATED ORAL
Qty: 90 | Refills: 3 | Status: ACTIVE | COMMUNITY
Start: 2023-01-10 | End: 1900-01-01

## 2023-10-19 RX ORDER — CARVEDILOL 12.5 MG/1
12.5 TABLET, FILM COATED ORAL DAILY
Refills: 0 | Status: DISCONTINUED | COMMUNITY
End: 2023-10-19

## 2023-12-06 ENCOUNTER — RX RENEWAL (OUTPATIENT)
Age: 68
End: 2023-12-06

## 2023-12-06 RX ORDER — NEBIVOLOL 5 MG/1
5 TABLET ORAL DAILY
Qty: 90 | Refills: 2 | Status: ACTIVE | COMMUNITY
Start: 2023-10-19 | End: 1900-01-01

## 2024-05-03 ENCOUNTER — APPOINTMENT (OUTPATIENT)
Dept: HEART AND VASCULAR | Facility: CLINIC | Age: 69
End: 2024-05-03